# Patient Record
Sex: MALE | Race: WHITE | NOT HISPANIC OR LATINO | Employment: FULL TIME | URBAN - METROPOLITAN AREA
[De-identification: names, ages, dates, MRNs, and addresses within clinical notes are randomized per-mention and may not be internally consistent; named-entity substitution may affect disease eponyms.]

---

## 2017-08-08 ENCOUNTER — TELEPHONE (OUTPATIENT)
Dept: INTERNAL MEDICINE | Facility: CLINIC | Age: 62
End: 2017-08-08

## 2017-08-08 ENCOUNTER — OFFICE VISIT (OUTPATIENT)
Dept: INTERNAL MEDICINE | Facility: CLINIC | Age: 62
End: 2017-08-08
Payer: COMMERCIAL

## 2017-08-08 ENCOUNTER — HOSPITAL ENCOUNTER (OUTPATIENT)
Dept: RADIOLOGY | Facility: HOSPITAL | Age: 62
Discharge: HOME | End: 2017-08-08
Attending: FAMILY MEDICINE
Payer: COMMERCIAL

## 2017-08-08 VITALS
SYSTOLIC BLOOD PRESSURE: 108 MMHG | RESPIRATION RATE: 18 BRPM | DIASTOLIC BLOOD PRESSURE: 74 MMHG | TEMPERATURE: 99 F | HEART RATE: 88 BPM | HEIGHT: 71 IN | WEIGHT: 208.13 LBS | BODY MASS INDEX: 29.14 KG/M2

## 2017-08-08 DIAGNOSIS — R06.09 DYSPNEA ON EXERTION: ICD-10-CM

## 2017-08-08 DIAGNOSIS — R06.09 DYSPNEA ON EXERTION: Primary | ICD-10-CM

## 2017-08-08 PROCEDURE — 3008F BODY MASS INDEX DOCD: CPT | Mod: S$GLB,,, | Performed by: FAMILY MEDICINE

## 2017-08-08 PROCEDURE — 71020 XR CHEST PA AND LATERAL: CPT | Mod: TC,PO

## 2017-08-08 PROCEDURE — 99214 OFFICE O/P EST MOD 30 MIN: CPT | Mod: S$GLB,,, | Performed by: FAMILY MEDICINE

## 2017-08-08 PROCEDURE — 71020 XR CHEST PA AND LATERAL: CPT | Mod: 26,,, | Performed by: RADIOLOGY

## 2017-08-08 PROCEDURE — 99999 PR PBB SHADOW E&M-EST. PATIENT-LVL III: CPT | Mod: PBBFAC,,, | Performed by: FAMILY MEDICINE

## 2017-08-08 PROCEDURE — 93010 ELECTROCARDIOGRAM REPORT: CPT | Mod: S$GLB,,, | Performed by: INTERNAL MEDICINE

## 2017-08-08 PROCEDURE — 93005 ELECTROCARDIOGRAM TRACING: CPT | Mod: S$GLB,,, | Performed by: FAMILY MEDICINE

## 2017-08-08 RX ORDER — ALBUTEROL SULFATE 90 UG/1
2 AEROSOL, METERED RESPIRATORY (INHALATION) EVERY 6 HOURS PRN
Qty: 18 G | Refills: 11 | Status: SHIPPED | OUTPATIENT
Start: 2017-08-08 | End: 2018-08-06 | Stop reason: SDUPTHER

## 2017-08-08 RX ORDER — FLUTICASONE PROPIONATE 50 MCG
2 SPRAY, SUSPENSION (ML) NASAL DAILY
Qty: 16 G | Refills: 11 | Status: SHIPPED | OUTPATIENT
Start: 2017-08-08 | End: 2017-09-07

## 2017-08-08 NOTE — TELEPHONE ENCOUNTER
----- Message from Henry Allen MD sent at 8/8/2017 10:06 AM CDT -----  Chest X-ray is normal.  Please inform the patient.  Thank you.

## 2017-08-08 NOTE — PROGRESS NOTES
Subjective:       Patient ID: Kenny Alcala is a 61 y.o. male.    Chief Complaint: Shortness of Breath (concerned with being unable to catch his breath at times; wheezing at times)    HPI 61-year-old white male ex-smoker presents to clinic today secondary to complaints of breathing difficulties.  He reports that he has recently begun exercising again and has begun noticing extreme difficulty catching his breath during and after exertion.  He reports that he feels like he can only exert himself for approximately 100 yards of running before he gets extremely short of breath and feels that he is on able to catch his breath.  He reports a sensation of having difficulty getting air.  He denies any chest pain or any shortness of breath at rest.  He states that during these episodes he also gets a severe headache.  Secondarily he does note frequent postnasal drip and runny nose.  Review of Systems   Constitutional: Negative for appetite change, chills, fatigue and fever.   HENT: Positive for postnasal drip and rhinorrhea. Negative for congestion, ear pain, hearing loss, sinus pressure, sore throat and tinnitus.    Eyes: Negative for redness, itching and visual disturbance.   Respiratory: Positive for shortness of breath (with exertion). Negative for cough and chest tightness.    Cardiovascular: Negative for chest pain and palpitations.   Gastrointestinal: Negative for abdominal pain, constipation, diarrhea, nausea and vomiting.   Genitourinary: Negative for decreased urine volume, difficulty urinating, dysuria, frequency, hematuria and urgency.   Musculoskeletal: Negative for back pain, myalgias, neck pain and neck stiffness.   Skin: Negative for rash.   Neurological: Positive for headaches. Negative for dizziness and light-headedness.   Psychiatric/Behavioral: Negative.        Objective:      Physical Exam   Constitutional: He is oriented to person, place, and time. He appears well-developed and well-nourished. No  distress.   HENT:   Head: Normocephalic and atraumatic.   Right Ear: External ear normal.   Left Ear: External ear normal.   Nose: Nose normal.   Mouth/Throat: Oropharynx is clear and moist. No oropharyngeal exudate.   Eyes: Conjunctivae and EOM are normal. Pupils are equal, round, and reactive to light. Right eye exhibits no discharge. Left eye exhibits no discharge. No scleral icterus.   Neck: Normal range of motion. Neck supple. No JVD present. No tracheal deviation present. No thyromegaly present.   Cardiovascular: Normal rate, regular rhythm, normal heart sounds and intact distal pulses.  Exam reveals no gallop and no friction rub.    No murmur heard.  Pulmonary/Chest: Effort normal and breath sounds normal. No stridor. No respiratory distress. He has no wheezes. He has no rales.   Abdominal: Soft. Bowel sounds are normal. He exhibits no distension and no mass. There is no tenderness. There is no rebound and no guarding.   Musculoskeletal: Normal range of motion. He exhibits no edema or tenderness.   Lymphadenopathy:     He has no cervical adenopathy.   Neurological: He is alert and oriented to person, place, and time.   Skin: Skin is warm and dry. No rash noted. He is not diaphoretic. No erythema. No pallor.   Psychiatric: He has a normal mood and affect. His behavior is normal. Judgment and thought content normal.   Nursing note and vitals reviewed.    EKG: Normal sinus rhythm ventricular rate 77 bpm.  Assessment:       1. Dyspnea on exertion        Plan:       Dyspnea on exertion  -     X-Ray Chest PA And Lateral; Future; Expected date: 08/08/2017  -     Complete PFT with bronchodilator; Future  -     PULSE OXIMETRY WITH REST - PULM; Future  -     IN OFFICE EKG 12-LEAD (to Muse)  -     Exercise stress echo with color flow; Future  -     fluticasone (FLONASE) 50 mcg/actuation nasal spray; 2 sprays by Each Nare route once daily.  Dispense: 16 g; Refill: 11  -     albuterol 90 mcg/actuation inhaler; Inhale 2  puffs into the lungs every 6 (six) hours as needed for Wheezing or Shortness of Breath. Rescue  Dispense: 18 g; Refill: 11--recommend 2 inhalations 30 minutes prior to exercise.  Return to clinic as needed if symptoms persist or worsen.

## 2017-08-10 ENCOUNTER — TELEPHONE (OUTPATIENT)
Dept: INTERNAL MEDICINE | Facility: CLINIC | Age: 62
End: 2017-08-10

## 2017-08-10 NOTE — TELEPHONE ENCOUNTER
----- Message from Porfirio Flower sent at 8/10/2017  9:35 AM CDT -----  Contact: self   Patient has questions regarding Stress and PULMONARY function  test . Need procedure codes for insurance.     Please advise

## 2018-06-08 DIAGNOSIS — Z12.11 COLON CANCER SCREENING: ICD-10-CM

## 2018-11-19 ENCOUNTER — CLINICAL SUPPORT (OUTPATIENT)
Dept: OTHER | Facility: CLINIC | Age: 63
End: 2018-11-19
Payer: COMMERCIAL

## 2018-11-21 VITALS — BODY MASS INDEX: 30.28 KG/M2 | HEIGHT: 70 IN

## 2019-08-19 ENCOUNTER — OFFICE VISIT (OUTPATIENT)
Dept: URGENT CARE | Facility: CLINIC | Age: 64
End: 2019-08-19
Payer: COMMERCIAL

## 2019-08-19 VITALS
SYSTOLIC BLOOD PRESSURE: 139 MMHG | DIASTOLIC BLOOD PRESSURE: 87 MMHG | HEIGHT: 69 IN | WEIGHT: 205 LBS | TEMPERATURE: 98 F | HEART RATE: 68 BPM | BODY MASS INDEX: 30.36 KG/M2

## 2019-08-19 DIAGNOSIS — S06.0X0A CONCUSSION WITHOUT LOSS OF CONSCIOUSNESS, INITIAL ENCOUNTER: ICD-10-CM

## 2019-08-19 DIAGNOSIS — S13.9XXA NECK SPRAIN, INITIAL ENCOUNTER: Primary | ICD-10-CM

## 2019-08-19 DIAGNOSIS — M62.838 CERVICAL PARASPINOUS MUSCLE SPASM: ICD-10-CM

## 2019-08-19 DIAGNOSIS — M47.812 SPONDYLOSIS OF CERVICAL REGION WITHOUT MYELOPATHY OR RADICULOPATHY: ICD-10-CM

## 2019-08-19 PROCEDURE — 99204 PR OFFICE/OUTPT VISIT, NEW, LEVL IV, 45-59 MIN: ICD-10-PCS | Mod: S$GLB,,, | Performed by: PREVENTIVE MEDICINE

## 2019-08-19 PROCEDURE — 99204 OFFICE O/P NEW MOD 45 MIN: CPT | Mod: S$GLB,,, | Performed by: PREVENTIVE MEDICINE

## 2019-08-19 PROCEDURE — 72040 XR CERVICAL SPINE 2 OR 3 VIEWS: ICD-10-PCS | Mod: FY,S$GLB,, | Performed by: RADIOLOGY

## 2019-08-19 PROCEDURE — 72040 X-RAY EXAM NECK SPINE 2-3 VW: CPT | Mod: FY,S$GLB,, | Performed by: RADIOLOGY

## 2019-08-19 RX ORDER — HYDROCODONE BITARTRATE AND ACETAMINOPHEN 5; 325 MG/1; MG/1
1 TABLET ORAL EVERY 6 HOURS PRN
Qty: 10 TABLET | Refills: 0 | Status: SHIPPED | OUTPATIENT
Start: 2019-08-19 | End: 2019-09-18 | Stop reason: SDUPTHER

## 2019-08-19 RX ORDER — METHOCARBAMOL 500 MG/1
500 TABLET, FILM COATED ORAL NIGHTLY
Qty: 30 TABLET | Refills: 1 | Status: SHIPPED | OUTPATIENT
Start: 2019-08-19 | End: 2019-09-18 | Stop reason: SDUPTHER

## 2019-08-19 RX ORDER — ETODOLAC 400 MG/1
400 TABLET, FILM COATED ORAL 2 TIMES DAILY
Qty: 60 TABLET | Refills: 1 | Status: SHIPPED | OUTPATIENT
Start: 2019-08-19 | End: 2019-09-18 | Stop reason: SDUPTHER

## 2019-08-19 NOTE — LETTER
[facility]  [address], [address]  [place] [address]  Phone: [phone]  Fax: [phone]  [facility]: [id number]    Pt Name: Kenny Alcala  Injury Date: 08/14/2019   Employee ID: [id number] Date of  Treatment: 08/19/2019   Company: [id number] [place] [address] [employer]      Appointment Time:   Arrived: 4:17 PM   Provider: [facility] Time Out: 6:05 PM     Office Treatment:   1. Neck sprain, initial encounter    2. Cervical paraspinous muscle spasm    3. Concussion without loss of consciousness, initial encounter    4. Spondylosis of cervical region without myelopathy or radiculopathy      Medications Ordered This Encounter   Medications    etodolac (LODINE) 400 MG tablet    HYDROcodone-acetaminophen (NORCO) 5-325 mg per tablet    methocarbamol (ROBAXIN) 500 MG Tab      Patient Instructions: Daily home exercises/warm soaks, PT to be scheduled once authorized, Begin Physical Therapy    Restrictions: Regular Duty     Return Appointment: 8/23/2019 at 3:30 PM     YONAS

## 2019-08-19 NOTE — PROGRESS NOTES
Subjective:       Patient ID: Kenny Alcala is a 63 y.o. male.    Chief Complaint: Neck Injury and Head Injury    Pt is being seen today for his neck and head injury DOI 08/14/2019 . Pt state that  He was setting  Down and the viber glass feel down and hit his head , since then he been having headache and neck pain . Pt pain today is 4/10 . Pt is not taking any meds-LN    Neck Injury    The current episode started in the past 7 days. The quality of the pain is described as aching. The pain is at a severity of 4/10. The symptoms are aggravated by position. The pain is same all the time.   Head Injury        Review of Systems   Constitution: Negative.   HENT: Negative.    Eyes: Negative.    Cardiovascular: Negative.    Respiratory: Negative.    Skin: Negative.    Musculoskeletal: Positive for neck pain and stiffness.   Gastrointestinal: Negative.    Genitourinary: Negative.    Neurological: Negative.        Objective:      Physical Exam   Constitutional: He is oriented to person, place, and time. He appears well-developed and well-nourished.   HENT:   Head: Normocephalic and atraumatic.   Right Ear: External ear normal.   Left Ear: External ear normal.   Nose: Nose normal.   Mouth/Throat: Oropharynx is clear and moist.   Eyes: Pupils are equal, round, and reactive to light. EOM are normal.   Neck: Normal range of motion.   Cardiovascular: Normal rate and regular rhythm.   Pulmonary/Chest: Effort normal and breath sounds normal.   Musculoskeletal:        Cervical back: He exhibits decreased range of motion, tenderness, pain and spasm. He exhibits no bony tenderness, no swelling, no edema, no deformity, no laceration and normal pulse.        Back:    Pain with spasm noted on palpation of the paracervical muscles bilaterally. Pain with flexion to 90 degrees, extension to 10 degrees,and rotation of the neck to 25 degrees radiating to both shoulders. No motor or sensory deficits of the upper extremities.  Distal pulses  intact.     Neurological: He is alert and oriented to person, place, and time.   Skin: Skin is warm and dry.   Psychiatric: He has a normal mood and affect.   Nursing note and vitals reviewed.    X-ray Cervical Spine 2 Or 3 Views    Result Date: 8/19/2019  EXAMINATION: XR CERVICAL SPINE 2 OR 3 VIEWS CLINICAL HISTORY: Sprain of joints and ligaments of unspecified parts of neck, initial encounter TECHNIQUE: AP, lateral and open mouth views of the cervical spine were performed. COMPARISON: None. FINDINGS: Significant disc space narrowing C3-4 and C4-5 as well as C5-6.  There is approximately 5 mm retrolisthesis of C4 with respect to C5.  No acute fracture seen.  Tip of the odontoid not well seen on the AP projection though otherwise intact.  Head is rotated.     Advanced degenerative change C3-C6.  There is a approximately 5 mm retrolisthesis C4 on C5.  Correlation with clinical findings and additional evaluation suggested.  If there is concern for an acute cervical spine injury MRI may be helpful due to the retrolisthesis of C4 on C5. This report was flagged in Epic as abnormal. Electronically signed by: Jamarcus Cano MD Date:    08/19/2019 Time:    17:54  Assessment:       1. Neck sprain, initial encounter    2. Cervical paraspinous muscle spasm    3. Concussion without loss of consciousness, initial encounter    4. Spondylosis of cervical region without myelopathy or radiculopathy        Plan:         Medications Ordered This Encounter   Medications    etodolac (LODINE) 400 MG tablet     Sig: Take 1 tablet (400 mg total) by mouth 2 (two) times daily.     Dispense:  60 tablet     Refill:  1    HYDROcodone-acetaminophen (NORCO) 5-325 mg per tablet     Sig: Take 1 tablet by mouth every 6 (six) hours as needed for Pain.     Dispense:  10 tablet     Refill:  0    methocarbamol (ROBAXIN) 500 MG Tab     Sig: Take 1 tablet (500 mg total) by mouth nightly.     Dispense:  30 tablet     Refill:  1     Patient Instructions:  Daily home exercises/warm soaks, PT to be scheduled once authorized, Begin Physical Therapy   Restrictions: Regular Duty  Follow up in about 4 days (around 8/23/2019).

## 2019-08-23 ENCOUNTER — OFFICE VISIT (OUTPATIENT)
Dept: URGENT CARE | Facility: CLINIC | Age: 64
End: 2019-08-23
Payer: COMMERCIAL

## 2019-08-23 VITALS
BODY MASS INDEX: 30.36 KG/M2 | SYSTOLIC BLOOD PRESSURE: 106 MMHG | HEART RATE: 74 BPM | OXYGEN SATURATION: 97 % | DIASTOLIC BLOOD PRESSURE: 70 MMHG | HEIGHT: 69 IN | TEMPERATURE: 98 F | RESPIRATION RATE: 20 BRPM | WEIGHT: 205 LBS

## 2019-08-23 DIAGNOSIS — M62.838 CERVICAL PARASPINOUS MUSCLE SPASM: ICD-10-CM

## 2019-08-23 DIAGNOSIS — S13.9XXD NECK SPRAIN, SUBSEQUENT ENCOUNTER: Primary | ICD-10-CM

## 2019-08-23 DIAGNOSIS — M47.812 SPONDYLOSIS OF CERVICAL REGION WITHOUT MYELOPATHY OR RADICULOPATHY: ICD-10-CM

## 2019-08-23 DIAGNOSIS — S06.0X0D CONCUSSION WITHOUT LOSS OF CONSCIOUSNESS, SUBSEQUENT ENCOUNTER: ICD-10-CM

## 2019-08-23 PROCEDURE — 99214 PR OFFICE/OUTPT VISIT, EST, LEVL IV, 30-39 MIN: ICD-10-PCS | Mod: S$GLB,,, | Performed by: PREVENTIVE MEDICINE

## 2019-08-23 PROCEDURE — 99214 OFFICE O/P EST MOD 30 MIN: CPT | Mod: S$GLB,,, | Performed by: PREVENTIVE MEDICINE

## 2019-08-23 NOTE — PROGRESS NOTES
Subjective:       Patient ID: Kenny Alcala is a 63 y.o. male.    Chief Complaint: Head Injury and Neck Injury     Follow-up of Head Injury and Neck Injury ( DOI 08-14-19 ) pain score today is 3/10 with complaints of intermittent headaches and a cracking sound in his Neck when he turns from side to side. Taking Etodolac 400mg, Norco 5-325mg, Robaxin 500mg and Daily Home exercises/warm soaks.     Review of Systems   Musculoskeletal: Positive for neck pain.   Neurological: Positive for dizziness and headaches. Negative for light-headedness and numbness.   All other systems reviewed and are negative.      Objective:      Physical Exam   Constitutional: He is oriented to person, place, and time. He appears well-developed and well-nourished.   HENT:   Head: Normocephalic and atraumatic.   Right Ear: External ear normal.   Left Ear: External ear normal.   Nose: Nose normal.   Mouth/Throat: Oropharynx is clear and moist.   Eyes: Pupils are equal, round, and reactive to light. EOM are normal.   Neck: Normal range of motion.   Cardiovascular: Normal rate and regular rhythm.   Pulmonary/Chest: Effort normal and breath sounds normal.   Musculoskeletal:        Cervical back: He exhibits decreased range of motion, tenderness, pain and spasm. He exhibits no bony tenderness, no swelling, no edema, no deformity, no laceration and normal pulse.        Back:    Pain with spasm noted on palpation of the paracervical muscles bilaterally. Pain with flexion to 90 degrees, extension to 10 degrees,and rotation of the neck to 25 degrees radiating to both shoulders. No motor or sensory deficits of the upper extremities.  Distal pulses intact.     Neurological: He is alert and oriented to person, place, and time.   Skin: Skin is warm and dry.   Psychiatric: He has a normal mood and affect.   Nursing note and vitals reviewed.      Assessment:       1. Neck sprain, subsequent encounter    2. Cervical paraspinous muscle spasm    3.  Concussion without loss of consciousness, subsequent encounter    4. Spondylosis of cervical region without myelopathy or radiculopathy        Plan:     Patient will be on vacation next week and may begin physical therapy upon his return. Home exercises demonstrated in office. Continue Lodine BID and Robaxin at night. May take Norco for pain as needed.     Patient Instructions: Daily home exercises/warm soaks, Begin Physical Therapy, PT to be scheduled once authorized   Restrictions: Regular Duty  Follow up in about 26 days (around 9/18/2019).

## 2019-08-23 NOTE — LETTER
[facility]  [address]  [place] [address]  Phone: [phone]  Fax: [phone]  [facility]: [id number]    Pt Name: Kenny Alcala  Injury Date: 08/14/2019   Employee ID: [id number] Date of  Treatment: 08/23/2019   Company: [email or web address]      Appointment Time: 03:15 PM Arrived: 3:25pm   Provider: Surya To MD Time Out:4:15pm     Office Treatment:   1. Neck sprain, subsequent encounter    2. Cervical paraspinous muscle spasm    3. Concussion without loss of consciousness, subsequent encounter    4. Spondylosis of cervical region without myelopathy or radiculopathy          Patient Instructions: Daily home exercises/warm soaks, Begin Physical Therapy, PT to be scheduled once authorized    Restrictions: Regular Duty     Return Appointment: 9/18/2019 at 3:00pm  jonh

## 2019-09-18 ENCOUNTER — OFFICE VISIT (OUTPATIENT)
Dept: URGENT CARE | Facility: CLINIC | Age: 64
End: 2019-09-18
Payer: COMMERCIAL

## 2019-09-18 DIAGNOSIS — S13.9XXD NECK SPRAIN, SUBSEQUENT ENCOUNTER: Primary | ICD-10-CM

## 2019-09-18 DIAGNOSIS — M47.812 SPONDYLOSIS OF CERVICAL REGION WITHOUT MYELOPATHY OR RADICULOPATHY: ICD-10-CM

## 2019-09-18 DIAGNOSIS — S06.0X0D CONCUSSION WITHOUT LOSS OF CONSCIOUSNESS, SUBSEQUENT ENCOUNTER: ICD-10-CM

## 2019-09-18 DIAGNOSIS — M62.838 CERVICAL PARASPINOUS MUSCLE SPASM: ICD-10-CM

## 2019-09-18 PROCEDURE — 99214 PR OFFICE/OUTPT VISIT, EST, LEVL IV, 30-39 MIN: ICD-10-PCS | Mod: S$GLB,,, | Performed by: PREVENTIVE MEDICINE

## 2019-09-18 PROCEDURE — 99214 OFFICE O/P EST MOD 30 MIN: CPT | Mod: S$GLB,,, | Performed by: PREVENTIVE MEDICINE

## 2019-09-18 RX ORDER — METHOCARBAMOL 500 MG/1
500 TABLET, FILM COATED ORAL NIGHTLY
Qty: 30 TABLET | Refills: 1 | Status: SHIPPED | OUTPATIENT
Start: 2019-09-18 | End: 2020-07-16

## 2019-09-18 RX ORDER — HYDROCODONE BITARTRATE AND ACETAMINOPHEN 5; 325 MG/1; MG/1
1 TABLET ORAL EVERY 6 HOURS PRN
Qty: 10 TABLET | Refills: 0 | Status: SHIPPED | OUTPATIENT
Start: 2019-09-18 | End: 2019-10-23 | Stop reason: ALTCHOICE

## 2019-09-18 RX ORDER — ETODOLAC 400 MG/1
400 TABLET, FILM COATED ORAL 2 TIMES DAILY
Qty: 60 TABLET | Refills: 1 | Status: SHIPPED | OUTPATIENT
Start: 2019-09-18 | End: 2020-07-16

## 2019-09-18 NOTE — LETTER
[facility] [place]  [address]  [place] [address]  Phone: [phone]  Fax: [phone]  [facility]: [id number]    Pt Name: Kenny Alcala  Injury Date: 08/14/2019   Employee ID: [id number] Date of  Treatment: 09/18/2019   Company: [employer]      Appointment Time: 02:45 PM Arrived: 3:00 pm   Provider: Surya To MD Time Out:4:25pm     Office Treatment:   Exam  Rx Given    1. Neck sprain, subsequent encounter    2. Cervical paraspinous muscle spasm    3. Concussion without loss of consciousness, subsequent encounter    4. Spondylosis of cervical region without myelopathy or radiculopathy      Medications Ordered This Encounter   Medications    etodolac (LODINE) 400 MG tablet    HYDROcodone-acetaminophen (NORCO) 5-325 mg per tablet    methocarbamol (ROBAXIN) 500 MG Tab      Patient Instructions: Daily home exercises/warm soaks, Begin Physical Therapy    Restrictions: Regular Duty     Return Appointment: 10/2/2019 at 1:30 pm  JUNE

## 2019-09-18 NOTE — PROGRESS NOTES
Subjective:       Patient ID: Kenny Alcala is a 63 y.o. male.    Chief Complaint: Head Injury and Neck Injury     RV- pt is being seen today for his  Head Injury and Neck Injury at work  formerly [facility]  ( DOI 08-14-19 ) pain score today  neck 3/10 head pain today is 0/10. No more headache.pt is taking Etodolac 400mg, Norco 5-325mg, Robaxin 500mg and Daily Home exercises/warm soaks. -LN    Head Injury    The quality of the pain is described as aching. The pain is at a severity of 3/10. The pain is moderate. He has tried nothing for the symptoms.   Neck Injury        Review of Systems   Constitution: Negative.   HENT: Negative.    Eyes: Negative.    Cardiovascular: Negative.    Respiratory: Negative.    Endocrine: Negative.    Hematologic/Lymphatic: Negative.    Skin: Negative.    Musculoskeletal: Positive for stiffness.   Gastrointestinal: Negative.    Genitourinary: Negative.    Neurological: Negative.    Allergic/Immunologic: Negative.        Objective:      Physical Exam   Constitutional: He is oriented to person, place, and time. He appears well-developed and well-nourished.   HENT:   Head: Normocephalic and atraumatic.   Right Ear: External ear normal.   Left Ear: External ear normal.   Nose: Nose normal.   Mouth/Throat: Oropharynx is clear and moist.   Eyes: Pupils are equal, round, and reactive to light. EOM are normal.   Neck: Normal range of motion.   Cardiovascular: Normal rate and regular rhythm.   Pulmonary/Chest: Effort normal and breath sounds normal.   Musculoskeletal:        Cervical back: He exhibits decreased range of motion, tenderness, pain and spasm. He exhibits no bony tenderness, no swelling, no edema, no deformity, no laceration and normal pulse.        Back:    Pain with spasm noted on palpation of the paracervical muscles bilaterally. Pain with flexion to 90 degrees, extension to 10 degrees,and rotation of the neck to 25 degrees radiating to both shoulders. No motor or sensory deficits of the  upper extremities.  Distal pulses intact.     Neurological: He is alert and oriented to person, place, and time.   Skin: Skin is warm and dry.   Psychiatric: He has a normal mood and affect.   Nursing note and vitals reviewed.      Assessment:       1. Neck sprain, subsequent encounter    2. Cervical paraspinous muscle spasm    3. Concussion without loss of consciousness, subsequent encounter    4. Spondylosis of cervical region without myelopathy or radiculopathy        Plan:     Patient will begin physical therapy previously ordered but delayed due to vacation trip.     Medications Ordered This Encounter   Medications    etodolac (LODINE) 400 MG tablet     Sig: Take 1 tablet (400 mg total) by mouth 2 (two) times daily.     Dispense:  60 tablet     Refill:  1    HYDROcodone-acetaminophen (NORCO) 5-325 mg per tablet     Sig: Take 1 tablet by mouth every 6 (six) hours as needed for Pain.     Dispense:  10 tablet     Refill:  0     Quantity prescribed more than 7 day supply? Yes, quantity medically necessary    methocarbamol (ROBAXIN) 500 MG Tab     Sig: Take 1 tablet (500 mg total) by mouth nightly.     Dispense:  30 tablet     Refill:  1     Patient Instructions: Daily home exercises/warm soaks, Begin Physical Therapy   Restrictions: Regular Duty  Follow up in about 2 weeks (around 10/2/2019).

## 2019-10-03 ENCOUNTER — TELEPHONE (OUTPATIENT)
Dept: URGENT CARE | Facility: CLINIC | Age: 64
End: 2019-10-03

## 2019-10-07 ENCOUNTER — OFFICE VISIT (OUTPATIENT)
Dept: URGENT CARE | Facility: CLINIC | Age: 64
End: 2019-10-07
Payer: COMMERCIAL

## 2019-10-07 DIAGNOSIS — S06.0X0D CONCUSSION WITHOUT LOSS OF CONSCIOUSNESS, SUBSEQUENT ENCOUNTER: ICD-10-CM

## 2019-10-07 DIAGNOSIS — M62.838 CERVICAL PARASPINOUS MUSCLE SPASM: ICD-10-CM

## 2019-10-07 DIAGNOSIS — S13.9XXD NECK SPRAIN, SUBSEQUENT ENCOUNTER: Primary | ICD-10-CM

## 2019-10-07 DIAGNOSIS — M47.812 SPONDYLOSIS OF CERVICAL REGION WITHOUT MYELOPATHY OR RADICULOPATHY: ICD-10-CM

## 2019-10-07 PROCEDURE — 99214 PR OFFICE/OUTPT VISIT, EST, LEVL IV, 30-39 MIN: ICD-10-PCS | Mod: S$GLB,,, | Performed by: PREVENTIVE MEDICINE

## 2019-10-07 PROCEDURE — 99214 OFFICE O/P EST MOD 30 MIN: CPT | Mod: S$GLB,,, | Performed by: PREVENTIVE MEDICINE

## 2019-10-07 NOTE — PROGRESS NOTES
Subjective:       Patient ID: Kenny Alcala is a 63 y.o. male.    Chief Complaint: Head Injury and Neck Injury     RV- pt is being seen today for his  Head Injury and Neck Injury at work  [facility]  ( DOI 08-14-19 ) pain score today  neck 4/10 head pain today is 4/10.  Still have  headache.pt is taking Etodolac 400mg, Norco 5-325mg, Robaxin 500mg  Has relief  and Daily Home exercises/warm soaks. -LN    Head Injury    The quality of the pain is described as aching and sharp. The pain is at a severity of 4/10. The pain is moderate. He has tried nothing for the symptoms.   Neck Injury        Review of Systems   All other systems reviewed and are negative.      Objective:      Physical Exam   Constitutional: He is oriented to person, place, and time. He appears well-developed and well-nourished.   HENT:   Head: Normocephalic and atraumatic.   Right Ear: External ear normal.   Left Ear: External ear normal.   Nose: Nose normal.   Mouth/Throat: Oropharynx is clear and moist.   Eyes: Pupils are equal, round, and reactive to light. EOM are normal.   Neck: Normal range of motion.   Cardiovascular: Normal rate and regular rhythm.   Pulmonary/Chest: Effort normal and breath sounds normal.   Musculoskeletal:        Cervical back: He exhibits decreased range of motion, tenderness, pain and spasm. He exhibits no bony tenderness, no swelling, no edema, no deformity, no laceration and normal pulse.        Back:    Pain persists with spasm noted on palpation of the paracervical muscles bilaterally. Pain with flexion to 90 degrees, extension to 10 degrees,and rotation of the neck to 25 degrees radiating to both shoulders. No motor or sensory deficits of the upper extremities.  Distal pulses intact.     Neurological: He is alert and oriented to person, place, and time.   Skin: Skin is warm and dry.   Psychiatric: He has a normal mood and affect.   Nursing note and vitals reviewed.      Assessment:       1. Neck sprain, subsequent  encounter    2. Cervical paraspinous muscle spasm    3. Concussion without loss of consciousness, subsequent encounter    4. Spondylosis of cervical region without myelopathy or radiculopathy        Plan:     May need MRI of the cervical spine if not improved with 2 weeks of physical therapy.       Patient Instructions: Daily home exercises/warm soaks, Continue Physical Therapy(Patient to continue physical therapy previously prescribed.. Continue Etodolac 400mg BID and Methocarbamol 500mg Daily. Take Hydrocodone 5mg only as needed)   Restrictions: Regular Duty  Follow up in about 16 days (around 10/23/2019).

## 2019-10-07 NOTE — LETTER
[facility]  [address]  [place] [address]  Phone: [phone]  Fax: [phone]  [facility]: [id number]    Pt Name: Kenny Alcala  Injury Date: 08/14/2019   Employee ID: [id number] Date of Treatment: 10/07/2019   Company: [employer]      Appointment Time: 04:30 PM Arrived: 4:25 PM   Provider: Surya To MD Time Out: 5:20 PM     Office Treatment:   1. Neck sprain, subsequent encounter    2. Cervical paraspinous muscle spasm    3. Concussion without loss of consciousness, subsequent encounter    4. Spondylosis of cervical region without myelopathy or radiculopathy          Patient Instructions: Daily home exercises/warm soaks, Continue Physical Therapy(Patient to continue physical therapy previously prescribed.. Continue Etodolac 400mg BID and Methocarbamol 500mg Daily. Take Hydrocodone 5mg only as needed)    Restrictions: Regular Duty     Return Appointment: 10/23/2019 at 3:00 PM  [place]

## 2019-10-23 ENCOUNTER — OFFICE VISIT (OUTPATIENT)
Dept: URGENT CARE | Facility: CLINIC | Age: 64
End: 2019-10-23
Payer: COMMERCIAL

## 2019-10-23 DIAGNOSIS — S13.9XXD NECK SPRAIN, SUBSEQUENT ENCOUNTER: Primary | ICD-10-CM

## 2019-10-23 DIAGNOSIS — M54.2 NECK PAIN: ICD-10-CM

## 2019-10-23 DIAGNOSIS — M47.812 SPONDYLOSIS OF CERVICAL REGION WITHOUT MYELOPATHY OR RADICULOPATHY: ICD-10-CM

## 2019-10-23 DIAGNOSIS — S06.0X0D CONCUSSION WITHOUT LOSS OF CONSCIOUSNESS, SUBSEQUENT ENCOUNTER: ICD-10-CM

## 2019-10-23 DIAGNOSIS — M62.838 CERVICAL PARASPINOUS MUSCLE SPASM: ICD-10-CM

## 2019-10-23 PROCEDURE — 99214 PR OFFICE/OUTPT VISIT, EST, LEVL IV, 30-39 MIN: ICD-10-PCS | Mod: S$GLB,,, | Performed by: PREVENTIVE MEDICINE

## 2019-10-23 PROCEDURE — 99214 OFFICE O/P EST MOD 30 MIN: CPT | Mod: S$GLB,,, | Performed by: PREVENTIVE MEDICINE

## 2019-10-23 RX ORDER — HYDROCODONE BITARTRATE AND ACETAMINOPHEN 5; 325 MG/1; MG/1
1 TABLET ORAL EVERY 6 HOURS PRN
Qty: 30 TABLET | Refills: 0 | Status: SHIPPED | OUTPATIENT
Start: 2019-10-23 | End: 2020-07-16

## 2019-10-23 NOTE — PROGRESS NOTES
Subjective:       Patient ID: Kenny Alcala is a 63 y.o. male.    Chief Complaint: Head Injury and Neck Injury     RV- pt is being seen today for his  Head Injury and Neck Injury at work  [facility]  ( DOI 08-14-19 ) pain score today  neck 2/10 head pain today is 3/10.  Still have  headache.pt is taking Etodolac 400mg, Norco 5-325mg, Robaxin 500mg has relief,   but  pt stop taking it about a week  due to he think the meds  is the reason why he is getting headache every day   and Daily Home exercises/warm soaks. -LN    Head Injury    The quality of the pain is described as aching and sharp. The pain is at a severity of 3/10. The pain is moderate. He has tried nothing for the symptoms.   Neck Injury        Review of Systems   Musculoskeletal: Positive for stiffness.   All other systems reviewed and are negative.      Objective:      Physical Exam   Constitutional: He is oriented to person, place, and time. He appears well-developed and well-nourished.   HENT:   Head: Normocephalic and atraumatic.   Right Ear: External ear normal.   Left Ear: External ear normal.   Nose: Nose normal.   Mouth/Throat: Oropharynx is clear and moist.   Eyes: Pupils are equal, round, and reactive to light. EOM are normal.   Neck: Normal range of motion.   Cardiovascular: Normal rate and regular rhythm.   Pulmonary/Chest: Effort normal and breath sounds normal.   Musculoskeletal:        Cervical back: He exhibits decreased range of motion, tenderness, pain and spasm. He exhibits no bony tenderness, no swelling, no edema, no deformity, no laceration and normal pulse.        Back:    Pain persists with spasm noted on palpation of the paracervical muscles bilaterally. Pain with flexion to 90 degrees, extension to 10 degrees,and rotation of the neck to 25 degrees radiating to both shoulders. No motor or sensory deficits of the upper extremities.  Distal pulses intact.     Neurological: He is alert and oriented to person, place, and time.    Skin: Skin is warm and dry.   Psychiatric: He has a normal mood and affect.   Nursing note and vitals reviewed.      Assessment:       1. Neck sprain, subsequent encounter    2. Cervical paraspinous muscle spasm    3. Concussion without loss of consciousness, subsequent encounter    4. Spondylosis of cervical region without myelopathy or radiculopathy        Plan:            Patient Instructions: Daily home exercises/warm soaks, MRI to be scheduled once authorized(Hold physical therapy at this time.)   Restrictions: Regular Duty  Follow up in about 2 weeks (around 11/6/2019).

## 2019-10-23 NOTE — LETTER
[facility]  [address], [address]  [place] [address]  Phone: [phone]  Fax: [phone]  [facility]: [id number]    Pt Name: Kenny Alcala  Injury Date: 08/14/2019   Employee ID: [id number] Date of Treatment: 10/23/2019   Company: [employer]      Appointment Time: 3:00pm Arrived: 2:55pm   Provider: Surya To MD Time Out:4:20pm     Office Treatment:   EXAM  RX GIVEN  MRI CERVICAL SPINE WITHOUT CONTRAST    1. Neck sprain, subsequent encounter    2. Cervical paraspinous muscle spasm    3. Concussion without loss of consciousness, subsequent encounter    4. Spondylosis of cervical region without myelopathy or radiculopathy    5. Neck pain      Medications Ordered This Encounter   Medications    HYDROcodone-acetaminophen (NORCO) 5-325 mg per tablet      Patient Instructions: Daily home exercises/warm soaks, MRI to be scheduled once authorized(Hold physical therapy at this time.)    Restrictions: Regular Duty     Return Appointment: 11/6/2019 at 2:00PM  JUNE

## 2019-11-06 ENCOUNTER — TELEPHONE (OUTPATIENT)
Dept: URGENT CARE | Facility: CLINIC | Age: 64
End: 2019-11-06

## 2019-11-11 ENCOUNTER — OFFICE VISIT (OUTPATIENT)
Dept: URGENT CARE | Facility: CLINIC | Age: 64
End: 2019-11-11
Payer: COMMERCIAL

## 2019-11-11 DIAGNOSIS — S06.0X0D CONCUSSION WITHOUT LOSS OF CONSCIOUSNESS, SUBSEQUENT ENCOUNTER: ICD-10-CM

## 2019-11-11 DIAGNOSIS — M47.812 SPONDYLOSIS OF CERVICAL REGION WITHOUT MYELOPATHY OR RADICULOPATHY: ICD-10-CM

## 2019-11-11 DIAGNOSIS — M50.30 DEGENERATIVE DISC DISEASE, CERVICAL: ICD-10-CM

## 2019-11-11 DIAGNOSIS — S13.9XXD NECK SPRAIN, SUBSEQUENT ENCOUNTER: Primary | ICD-10-CM

## 2019-11-11 DIAGNOSIS — M62.838 CERVICAL PARASPINOUS MUSCLE SPASM: ICD-10-CM

## 2019-11-11 PROCEDURE — 99214 PR OFFICE/OUTPT VISIT, EST, LEVL IV, 30-39 MIN: ICD-10-PCS | Mod: S$GLB,,, | Performed by: PREVENTIVE MEDICINE

## 2019-11-11 PROCEDURE — 99214 OFFICE O/P EST MOD 30 MIN: CPT | Mod: S$GLB,,, | Performed by: PREVENTIVE MEDICINE

## 2019-11-11 NOTE — LETTER
[facility]  [address], [address]  [place] [address]  Phone: [phone]  Fax: [phone]  [facility]: [id number]    Pt Name: Kenny Alcala  Injury Date: 08/14/2019   Employee ID:  Date of  Treatment: 11/11/2019   Company:[employer]      Appointment Time: 08:45 AM Arrived:08:55 AM   Provider: [facility] Time Out:10:00 AM     Office Treatment:   EXAM  LIGHT DUTY    1. Neck sprain, subsequent encounter    2. Degenerative disc disease, cervical    3. Spondylosis of cervical region without myelopathy or radiculopathy    4. Cervical paraspinous muscle spasm    5. Concussion without loss of consciousness, subsequent encounter          Patient Instructions: Daily home exercises/warm soaks      Restrictions: No lifting/pushing/pulling more than 25 lbs, Avoid frequent bending/lifting/twisting(Avoid overhead lifting. These are permanent restrictions.)     Return Appointment: 12/9/2019 at: 08:30 AM   [place]

## 2019-11-11 NOTE — PROGRESS NOTES
Subjective:       Patient ID: Kenny Alcala is a 63 y.o. male.    Chief Complaint: Head Injury and Neck Injury     RV- pt is being seen today for his  Head Injury and Neck Injury at work  [facility]  ( DOI 08-14-19 ) pain score today  neck 1/10 head pain today is 1/10. pt is taking Etodolac 400mg, Norco 5-325mg, Robaxin 500mg has relief,pt work status is light duty  -LN    Head Injury    The quality of the pain is described as aching. The pain is at a severity of 1/10. The pain is mild. He has tried nothing for the symptoms.   Neck Injury        Review of Systems   Musculoskeletal: Positive for neck pain. Negative for stiffness.   All other systems reviewed and are negative.      Objective:      Physical Exam   Constitutional: He is oriented to person, place, and time. He appears well-developed and well-nourished.   HENT:   Head: Normocephalic and atraumatic.   Right Ear: External ear normal.   Left Ear: External ear normal.   Nose: Nose normal.   Mouth/Throat: Oropharynx is clear and moist.   Eyes: Pupils are equal, round, and reactive to light. EOM are normal.   Neck: Normal range of motion.   Cardiovascular: Normal rate and regular rhythm.   Pulmonary/Chest: Effort normal and breath sounds normal.   Musculoskeletal:        Cervical back: He exhibits decreased range of motion, tenderness, pain and spasm. He exhibits no bony tenderness, no swelling, no edema, no deformity, no laceration and normal pulse.        Back:    Pain persists with spasm noted on palpation of the paracervical muscles bilaterally. Pain with flexion to 90 degrees, extension to 10 degrees,and rotation of the neck to 25 degrees radiating to both shoulders. No motor or sensory deficits of the upper extremities.  Distal pulses intact.     Neurological: He is alert and oriented to person, place, and time.   Skin: Skin is warm and dry.   Psychiatric: He has a normal mood and affect.   Nursing note and vitals reviewed.      Assessment:       1. Neck  sprain, subsequent encounter    2. Degenerative disc disease, cervical    3. Spondylosis of cervical region without myelopathy or radiculopathy    4. Cervical paraspinous muscle spasm    5. Concussion without loss of consciousness, subsequent encounter        Plan:     Discussed at length the results of the MRI of the cervical spine which revealed moderately severe multilevel degenerative spine and disc without obvious herniation or nerve root compression. No fractures were identified. Patient is aware of the significance of these findings and will need permanent restrictions. Take Flexeril as needed for pain.        Patient Instructions: Daily home exercises/warm soaks   Restrictions: No lifting/pushing/pulling more than 25 lbs, Avoid frequent bending/lifting/twisting(Avoid overhead lifting. These are permanent restrictions.)  Follow up in about 4 weeks (around 12/9/2019).

## 2020-06-08 PROBLEM — Z12.5 SCREENING FOR PROSTATE CANCER: Status: ACTIVE | Noted: 2020-06-08

## 2020-06-08 PROBLEM — Z00.00 WELL ADULT EXAM: Status: ACTIVE | Noted: 2020-06-08

## 2020-06-08 PROBLEM — Z79.899 ENCOUNTER FOR LONG-TERM (CURRENT) USE OF MEDICATIONS: Status: ACTIVE | Noted: 2020-06-08

## 2020-06-08 PROBLEM — Z12.11 SCREEN FOR COLON CANCER: Status: ACTIVE | Noted: 2020-06-08

## 2020-09-07 PROBLEM — Z00.00 WELL ADULT EXAM: Status: RESOLVED | Noted: 2020-06-08 | Resolved: 2020-09-07

## 2021-05-10 ENCOUNTER — PATIENT MESSAGE (OUTPATIENT)
Dept: RESEARCH | Facility: HOSPITAL | Age: 66
End: 2021-05-10

## 2022-04-06 ENCOUNTER — OFFICE VISIT (OUTPATIENT)
Dept: URGENT CARE | Facility: CLINIC | Age: 67
End: 2022-04-06
Payer: COMMERCIAL

## 2022-04-06 VITALS
HEART RATE: 89 BPM | OXYGEN SATURATION: 96 % | DIASTOLIC BLOOD PRESSURE: 89 MMHG | WEIGHT: 224 LBS | BODY MASS INDEX: 32.07 KG/M2 | TEMPERATURE: 99 F | HEIGHT: 70 IN | SYSTOLIC BLOOD PRESSURE: 127 MMHG

## 2022-04-06 DIAGNOSIS — S00.03XA CONTUSION OF SCALP, INITIAL ENCOUNTER: ICD-10-CM

## 2022-04-06 DIAGNOSIS — M54.2 NECK PAIN: Primary | ICD-10-CM

## 2022-04-06 DIAGNOSIS — Z02.6 ENCOUNTER RELATED TO WORKER'S COMPENSATION CLAIM: ICD-10-CM

## 2022-04-06 PROCEDURE — 99203 OFFICE O/P NEW LOW 30 MIN: CPT | Mod: S$GLB,,, | Performed by: NURSE PRACTITIONER

## 2022-04-06 PROCEDURE — 72040 XR CERVICAL SPINE 2 OR 3 VIEWS: ICD-10-PCS | Mod: S$GLB,,, | Performed by: RADIOLOGY

## 2022-04-06 PROCEDURE — 72040 X-RAY EXAM NECK SPINE 2-3 VW: CPT | Mod: S$GLB,,, | Performed by: RADIOLOGY

## 2022-04-06 PROCEDURE — 99203 PR OFFICE/OUTPT VISIT, NEW, LEVL III, 30-44 MIN: ICD-10-PCS | Mod: S$GLB,,, | Performed by: NURSE PRACTITIONER

## 2022-04-06 RX ORDER — ASPIRIN 325 MG
325 TABLET ORAL DAILY
COMMUNITY

## 2022-04-06 RX ORDER — NAPROXEN SODIUM 220 MG
220 TABLET ORAL 2 TIMES DAILY WITH MEALS
COMMUNITY

## 2022-04-06 NOTE — LETTER
[facility]  [address] [address]  Phone: [phone]  Fax: [phone]  [facility]: [id number]    Pt Name: Kenny Mirandaor  Injury Date: 04/06/2022   Employee ID: [id number] Date of First Treatment: 04/06/2022   Company: [employer]      Appointment Time: 11:25 AM Arrived: 11:39am   Provider: TAMRA Davila Time Out:1:15pm     Office Treatment:   1. Neck pain    2. Encounter related to worker's compensation claim    3. Contusion of scalp, initial encounter          Patient Instructions: Apply ice 24-48 hours then apply heat/warm soaks, Daily home exercises/warm soaks      Restrictions: Regular Duty     Return Appointment: 4/13/2022 at 10:30am.    EC
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
## 2022-04-07 ENCOUNTER — OFFICE VISIT (OUTPATIENT)
Dept: URGENT CARE | Facility: CLINIC | Age: 67
End: 2022-04-07
Payer: COMMERCIAL

## 2022-04-07 DIAGNOSIS — Z02.6 ENCOUNTER RELATED TO WORKER'S COMPENSATION CLAIM: Primary | ICD-10-CM

## 2022-04-07 LAB
CTP QC/QA: YES
POC 5 PANEL DRUG SCREEN: NEGATIVE

## 2022-04-07 PROCEDURE — 80305 DRUG TEST PRSMV DIR OPT OBS: CPT | Mod: S$GLB,,, | Performed by: PREVENTIVE MEDICINE

## 2022-04-07 PROCEDURE — 80305 POCT RAPID DRUG SCREEN 5 PANEL: ICD-10-PCS | Mod: S$GLB,,, | Performed by: PREVENTIVE MEDICINE

## 2022-04-14 ENCOUNTER — TELEPHONE (OUTPATIENT)
Dept: URGENT CARE | Facility: CLINIC | Age: 67
End: 2022-04-14
Payer: COMMERCIAL

## 2022-04-14 NOTE — TELEPHONE ENCOUNTER
Called patient in reference to his missed [facility] appointment, no answer, received a message stating that this call can not be completed at this time. LUCILLE